# Patient Record
Sex: MALE | Race: WHITE | URBAN - METROPOLITAN AREA
[De-identification: names, ages, dates, MRNs, and addresses within clinical notes are randomized per-mention and may not be internally consistent; named-entity substitution may affect disease eponyms.]

---

## 2017-07-15 ENCOUNTER — HOSPITAL ENCOUNTER (EMERGENCY)
Facility: CLINIC | Age: 52
Discharge: HOME | End: 2017-07-15
Attending: EMERGENCY MEDICINE | Admitting: EMERGENCY MEDICINE
Payer: COMMERCIAL

## 2017-07-15 VITALS
RESPIRATION RATE: 18 BRPM | HEIGHT: 70 IN | WEIGHT: 220 LBS | HEART RATE: 117 BPM | SYSTOLIC BLOOD PRESSURE: 136 MMHG | OXYGEN SATURATION: 98 % | BODY MASS INDEX: 31.5 KG/M2 | TEMPERATURE: 99.2 F | DIASTOLIC BLOOD PRESSURE: 93 MMHG

## 2017-07-15 DIAGNOSIS — S79.922A INJURY OF LEFT THIGH, INITIAL ENCOUNTER: ICD-10-CM

## 2017-07-15 DIAGNOSIS — S70.12XA HEMATOMA OF LEFT THIGH, INITIAL ENCOUNTER: ICD-10-CM

## 2017-07-15 PROCEDURE — 99283 EMERGENCY DEPT VISIT LOW MDM: CPT

## 2017-07-15 PROCEDURE — 25000132 ZZH RX MED GY IP 250 OP 250 PS 637: Performed by: EMERGENCY MEDICINE

## 2017-07-15 RX ORDER — OXYCODONE HYDROCHLORIDE 5 MG/1
10 TABLET ORAL ONCE
Status: COMPLETED | OUTPATIENT
Start: 2017-07-15 | End: 2017-07-15

## 2017-07-15 RX ORDER — OXYCODONE HYDROCHLORIDE 5 MG/1
5-10 TABLET ORAL EVERY 6 HOURS PRN
Qty: 15 TABLET | Refills: 0 | Status: SHIPPED | OUTPATIENT
Start: 2017-07-15

## 2017-07-15 RX ADMIN — OXYCODONE HYDROCHLORIDE 10 MG: 5 TABLET ORAL at 22:41

## 2017-07-15 ASSESSMENT — ENCOUNTER SYMPTOMS
HEMATURIA: 0
BLOOD IN STOOL: 0

## 2017-07-15 NOTE — ED AVS SNAPSHOT
[facility] Emergency Department    [address] [place] [address]    Phone:  [phone]    Fax:  [phone]                                       Shola Oden   MRN: [id number]    Department:  [facility] Emergency Department   Date of Visit:  7/15/2017           Patient Information     Date Of Birth          1965        Your diagnoses for this visit were:     Injury of left thigh, initial encounter     Hematoma of left thigh, initial encounter        You were seen by Vladislav Martin MD.        Discharge Instructions       Please make an appointment to follow up with [facility] [phone] in 5-7 days unless symptoms completely resolve.      Opioid Medication     You have been given a prescription for an opioid (narcotic) pain medicine and/or have received a pain medicine while here in the emergency department. These medicines can make you drowsy or impaired. You must not drive, operate dangerous equipment, or engage in any other dangerous activities while taking these medications. If you drive while taking these medications, you could be arrested for DUI, or driving under the influence. Do not drink any alcohol while you are taking these medications.   Opioid pain medications can cause addiction. If you have a history of chemical dependency of any type, you are at a higher risk of becoming addicted to pain medications.  Only take these prescribed medications to treat your pain when all other options have been tried. Take it for as short a time and as few doses as possible. Store your pain pills in a secure place, as they are frequently stolen and provide a dangerous opportunity for children or visitors in your house to start abusing these powerful medications. We will not replace any lost or stolen medicine.  As soon as your pain is better, you should flush all your remaining medication.   Many prescription pain medications contain Tylenol  (acetaminophen), including Vicodin, Tylenol #3, Norco, Lortab, and Percocet.  You should not take any extra pills of Tylenol if you are using these prescription medications or you can get very sick.  Do not ever take more than 4000 mg of acetaminophen in any 24 hour period.  All opioids tend to cause constipation. Drink plenty of water and eat foods that have a lot of fiber, such as fruits, vegetables, prune juice, apple juice and high fiber cereal.  Take a laxative if you don t move your bowels at least every other day. Miralax, Milk of Magnesia, Colace, or Senna can be used to keep you regular.          Discharge References/Attachments     HEMATOMA (ENGLISH)      24 Hour Appointment Hotline       To make an appointment at any [facility] (formerly [facility]), call [phone] ([phone]). If you don't have a family doctor or clinic, we will help you find one. [facility] clinics are conveniently located to serve the needs of you and your family.             Review of your medicines      START taking        Dose / Directions Last dose taken    oxyCODONE 5 MG IR tablet   Commonly known as:  ROXICODONE   Dose:  5-10 mg   Quantity:  15 tablet        Take 1-2 tablets (5-10 mg) by mouth every 6 hours as needed for pain   Refills:  0          Our records show that you are taking the medicines listed below. If these are incorrect, please call your family doctor or clinic.        Dose / Directions Last dose taken    CELEBREX PO   Dose:  200 mg   Indication:  Acute Pain        Take 200 mg by mouth daily   Refills:  0                Prescriptions were sent or printed at these locations (1 Prescription)                   Other Prescriptions                Printed at Department/Unit printer (1 of 1)         oxyCODONE (ROXICODONE) 5 MG IR tablet                Orders Needing Specimen Collection     None      Pending Results     No orders found from 7/13/2017 to 7/16/2017.            Pending Culture Results     No orders found from  7/13/2017 to 7/16/2017.            Pending Results Instructions     If you had any lab results that were not finalized at the time of your Discharge, you can call the ED Lab Result RN at [phone]. You will be contacted by this team for any positive Lab results or changes in treatment. The nurses are available 7 days a week from 10A to 6:30P.  You can leave a message 24 hours per day and they will return your call.        Test Results From Your Hospital Stay               Clinical Quality Measure: Blood Pressure Screening     Your blood pressure was checked while you were in the emergency department today. The last reading we obtained was  BP: (!) 136/93 . Please read the guidelines below about what these numbers mean and what you should do about them.  If your systolic blood pressure (the top number) is less than 120 and your diastolic blood pressure (the bottom number) is less than 80, then your blood pressure is normal. There is nothing more that you need to do about it.  If your systolic blood pressure (the top number) is 120-139 or your diastolic blood pressure (the bottom number) is 80-89, your blood pressure may be higher than it should be. You should have your blood pressure rechecked within a year by a primary care provider.  If your systolic blood pressure (the top number) is 140 or greater or your diastolic blood pressure (the bottom number) is 90 or greater, you may have high blood pressure. High blood pressure is treatable, but if left untreated over time it can put you at risk for heart attack, stroke, or kidney failure. You should have your blood pressure rechecked by a primary care provider within the next 4 weeks.  If your provider in the emergency department today gave you specific instructions to follow-up with your doctor or provider even sooner than that, you should follow that instruction and not wait for up to 4 weeks for your follow-up visit.        Thank you for choosing [facility]      "  Thank you for choosing [facility] for your care. Our goal is always to provide you with excellent care. Hearing back from our patients is one way we can continue to improve our services. Please take a few minutes to complete the written survey that you may receive in the mail after you visit with us. Thank you!        [facility] Information     [facility] lets you send messages to your doctor, view your test results, renew your prescriptions, schedule appointments and more. To sign up, go to [email or web address] . Click on \"Log in\" on the left side of the screen, which will take you to the Welcome page. Then click on \"Sign up Now\" on the right side of the page.     You will be asked to enter the access code listed below, as well as some personal information. Please follow the directions to create your username and password.     Your access code is: [email or web address]  Expires: 10/13/2017 10:44 PM     Your access code will  in 90 days. If you need help or a new code, please call your [facility] clinic or [phone].        Care EveryWhere ID     This is your Care EveryWhere ID. This could be used by other organizations to access your [facility] medical records  [id number]        Equal Access to Services     JAYA JENKINS : Ruba Hermosillo, waracheleda issa, qaybta kaalmada ademaame, lenard overton. So [facility] [phone].    ATENCIÓN: Si habla español, tiene a castañeda disposición servicios gratuitos de asistencia lingüística. Llame al [phone].    We comply with applicable federal civil rights laws and [place] laws. We do not discriminate on the basis of race, color, national origin, age, disability sex, sexual orientation or gender identity.            After Visit Summary       This is your record. Keep this with you and show to your community pharmacist(s) and doctor(s) at your next visit.                  "

## 2017-07-15 NOTE — ED AVS SNAPSHOT
[facility] Emergency Department    [address]    [facility] [address]    Phone:  [phone]    Fax:  [phone]                                       Shola Oden   MRN: [id number]    Department:  [facility] Emergency Department   Date of Visit:  7/15/2017           After Visit Summary Signature Page     I have received my discharge instructions, and my questions have been answered. I have discussed any challenges I see with this plan with the nurse or doctor.    ..........................................................................................................................................  Patient/Patient Representative Signature      ..........................................................................................................................................  Patient Representative Print Name and Relationship to Patient    ..................................................               ................................................  Date                                            Time    ..........................................................................................................................................  Reviewed by Signature/Title    ...................................................              ..............................................  Date                                                            Time

## 2017-07-15 NOTE — LETTER
[facility] EMERGENCY DEPARTMENT  [address] [place] [address]  [phone]    July 15, 2017    Shola Oden  [address] [place] [id number] [phone] (home) none (work)    : 1965      To Whom it may concern:    Shola Oden was seen in our Emergency Department today, July 15, 2017.   He may return to work on  but will have limited use of left leg until cleared by orthopedic surgeon or primary care physician.    Sincerely,        Vladislav Martin

## 2017-07-16 ASSESSMENT — ENCOUNTER SYMPTOMS: COUGH: 0

## 2017-07-16 NOTE — ED NOTES
Patient presents with a complaint of left thigh pain. Patient states that on Tuesday he fell off a 10 foot ladder striking his left thigh on a grill. Patient states that he has a large hematoma on the lateral left thigh that has become more painful today. Patient states that it only hurts with ambulation. Patient also states that 6 months ago he sustained a similar injury to the left thigh, but was not seen for it at that time. ABC intact, A&Ox4.

## 2017-07-16 NOTE — DISCHARGE INSTRUCTIONS
Please make an appointment to follow up with [facility] [phone] in 5-7 days unless symptoms completely resolve.      Opioid Medication     You have been given a prescription for an opioid (narcotic) pain medicine and/or have received a pain medicine while here in the emergency department. These medicines can make you drowsy or impaired. You must not drive, operate dangerous equipment, or engage in any other dangerous activities while taking these medications. If you drive while taking these medications, you could be arrested for DUI, or driving under the influence. Do not drink any alcohol while you are taking these medications.   Opioid pain medications can cause addiction. If you have a history of chemical dependency of any type, you are at a higher risk of becoming addicted to pain medications.  Only take these prescribed medications to treat your pain when all other options have been tried. Take it for as short a time and as few doses as possible. Store your pain pills in a secure place, as they are frequently stolen and provide a dangerous opportunity for children or visitors in your house to start abusing these powerful medications. We will not replace any lost or stolen medicine.  As soon as your pain is better, you should flush all your remaining medication.   Many prescription pain medications contain Tylenol (acetaminophen), including Vicodin, Tylenol #3, Norco, Lortab, and Percocet.  You should not take any extra pills of Tylenol if you are using these prescription medications or you can get very sick.  Do not ever take more than 4000 mg of acetaminophen in any 24 hour period.  All opioids tend to cause constipation. Drink plenty of water and eat foods that have a lot of fiber, such as fruits, vegetables, prune juice, apple juice and high fiber cereal.  Take a laxative if you don t move your bowels at least every other day. Miralax, Milk of Magnesia, Colace, or Senna can be used to keep you  regular.

## 2017-07-16 NOTE — ED PROVIDER NOTES
"  History     Chief Complaint:  Fall    HPI   Shola Oden is a 52 year old male who presents to the emergency department today for evaluation of a fall. The patient reports he fell off of a ladder onto a grill and then onto the ground on four days prior to arrival hitting his left lateral upper leg. The pain is described as  \"excruciating\". The swelling has grown tighter over time and hinders his movement. The patient report the pain as sharp and does not cause much pain when still. The patient took 3-4 aleve for the pain. He says the knee has been bending better with walking since the injury but the swelling in the leg had increased and is the reason for walking difficulty. He denies blood in urine and stool or coughing up blood. The patient does not report any other injuries.     Allergies:  Morphine     Medications:    Celecoxib     Past Medical History:    History reviewed. No pertinent past medical history.    Past Surgical History:    Orthopedic surgery    Family History:    History reviewed. No pertinent family history.     Social History:  The patient was accompanied to the ED by wife.  Smoking Status: never  Alcohol Use: yes  Marital Status:        Review of Systems   Respiratory: Negative for cough.    Gastrointestinal: Negative for blood in stool.   Genitourinary: Negative for hematuria.   Skin:        Bruising and hematoma to the left lateral upper leg   All other systems reviewed and are negative.    Physical Exam   First Vitals:  BP: (!) 136/93  Pulse: 117  Temp: 99.2  F (37.3  C)  Resp: 18  Height: 177.8 cm (5' 10\")  Weight: 99.8 kg (220 lb)  SpO2: 98 %      Physical Exam   Skin:            General:   Pleasant, age appropriate.  EYES:   Conjunctiva normal.  NECK:    Supple, no meningismus.   CV:     Regular rate and rhythm     No murmurs, rubs or gallops.       2+ DP pulses bilateral.  PULM:    Clear to auscultation bilateral.       No respiratory distress.      No wheezing, rales or " stridor.  ABD:    Soft, non-tender, non-distended.  No pulsatile masses.       No rebound or guarding.  MSK:     Left lower extremity : No gross deformity.       No tenderness at the hip or ankle.      Knee:       Minimal tenderness to lateral joint space        No knee effusion.  Full passive ROM.       Extensor mechanism intact.         Anterior and posterior drawer test normal.       Lachman's test normal.       No laxity of the MCL or LCL with valgus or varus strain.      Compartments to thigh and lower leg are soft and compressible.      Full passive ROM of hip, knee and ankle with minimal pain  LYMPH:   No cervical lymphadenopathy.  NEURO:   Left lower extremity :Strength is 5/5      Sensation intact.  SKIN:    Warm, dry and intact.       Left leg: warm to touch; cap refill < 2 seconds  PSYCH:    Mood is good and affect is appropriate.    Emergency Department Course     Interventions:  2241 Oxycodone 10mg PO     Emergency Department Course:  Nursing notes and vitals reviewed.  2230: I performed an exam of the patient as documented above.   I discussed the treatment plan with the patient. They expressed understanding of this plan and consented to discharge. They will be discharged home with instructions for care and follow up. In addition, the patient will return to the emergency department if their symptoms persist, worsen, if new symptoms arise or if there is any concern.  All questions were answered.    Impression & Plan      Medical Decision Making:  Shola Oden is a 52 year old male who I am seeing for a traumatic left thigh injury. He has no significant focal bony tenderness to draw concern for fracture or dislocation. He has clear hematoma and ecchymosis  through the posterior portion of the thigh and to a minor degree the posterior compartment of the left lower extremity. He has no evidence of compartment syndrome. There is an area to the left lateral thigh that feels like a defect which either  could be resulting hematoma versus acute tendon rupture with proximal retraction of the muscle belly. I recommend the patient take oxycodone as needed for pain, crutches with weight baring as tolerated and close follow up though orthopedic surgery. He may benefit from outpatient MRI if symptoms do not dramatically improve.      Diagnosis:    ICD-10-CM    1. Injury of left thigh, initial encounter S79.922A    2. Hematoma of left thigh, initial encounter S70.12XA      Disposition:   Discharged to home    Discharge Medications:  New Prescriptions    OXYCODONE (ROXICODONE) 5 MG IR TABLET    Take 1-2 tablets (5-10 mg) by mouth every 6 hours as needed for pain     Scribe Disclosure:  I, Kym Meade, am serving as a scribe at 10:49 PM on 7/15/2017 to document services personally performed by Vladislav Martin MD, based on my observations and the provider's statements to me.    7/15/2017   [facility] EMERGENCY DEPARTMENT       Vladislav Martin MD  07/16/17 0054